# Patient Record
Sex: MALE | Race: BLACK OR AFRICAN AMERICAN | NOT HISPANIC OR LATINO | Employment: OTHER | URBAN - METROPOLITAN AREA
[De-identification: names, ages, dates, MRNs, and addresses within clinical notes are randomized per-mention and may not be internally consistent; named-entity substitution may affect disease eponyms.]

---

## 2020-05-15 ENCOUNTER — NURSE TRIAGE (OUTPATIENT)
Dept: ADMINISTRATIVE | Facility: CLINIC | Age: 75
End: 2020-05-15

## 2020-05-15 NOTE — TELEPHONE ENCOUNTER
Error encounter.   Reason for Disposition   Caller has cancelled the call before the first contact    Additional Information   Negative: Caller is angry or rude (e.g., hangs up, verbally abusive, yelling)   Negative: Caller hangs up    Protocols used: NO CONTACT OR DUPLICATE CONTACT CALL-A-AH

## 2022-09-22 ENCOUNTER — TELEPHONE (OUTPATIENT)
Dept: CARDIOTHORACIC SURGERY | Facility: CLINIC | Age: 77
End: 2022-09-22
Payer: MEDICARE

## 2022-09-22 NOTE — TELEPHONE ENCOUNTER
----- Message from Briseida Hooper sent at 9/22/2022  4:34 PM CDT -----  Contact: Dr Santana office    ----- Message -----  From: Miah Galvez  Sent: 9/22/2022   9:24 AM CDT  To: [facility] Pierce Staff    Please call Windy from Dr Santana office regarding a referral she is trying to send for the patient      CB#  [phone]

## 2022-09-29 ENCOUNTER — TELEPHONE (OUTPATIENT)
Dept: CARDIOTHORACIC SURGERY | Facility: CLINIC | Age: 77
End: 2022-09-29
Payer: MEDICARE

## 2022-10-07 DIAGNOSIS — Z99.2: Primary | ICD-10-CM

## 2022-10-11 ENCOUNTER — OFFICE VISIT (OUTPATIENT)
Dept: CARDIOTHORACIC SURGERY | Facility: CLINIC | Age: 77
End: 2022-10-11
Payer: MEDICARE

## 2022-10-11 VITALS
HEART RATE: 80 BPM | DIASTOLIC BLOOD PRESSURE: 72 MMHG | BODY MASS INDEX: 22.71 KG/M2 | SYSTOLIC BLOOD PRESSURE: 110 MMHG | HEIGHT: 68 IN | RESPIRATION RATE: 17 BRPM | WEIGHT: 149.88 LBS

## 2022-10-11 DIAGNOSIS — Z99.2: ICD-10-CM

## 2022-10-11 DIAGNOSIS — N18.6 ESRD (END STAGE RENAL DISEASE): ICD-10-CM

## 2022-10-11 PROCEDURE — 1159F MED LIST DOCD IN RCRD: CPT | Mod: CPTII,S$GLB,, | Performed by: SURGERY

## 2022-10-11 PROCEDURE — 99204 PR OFFICE/OUTPT VISIT, NEW, LEVL IV, 45-59 MIN: ICD-10-PCS | Mod: S$GLB,,, | Performed by: SURGERY

## 2022-10-11 PROCEDURE — 3074F PR MOST RECENT SYSTOLIC BLOOD PRESSURE < 130 MM HG: ICD-10-PCS | Mod: CPTII,S$GLB,, | Performed by: SURGERY

## 2022-10-11 PROCEDURE — 3074F SYST BP LT 130 MM HG: CPT | Mod: CPTII,S$GLB,, | Performed by: SURGERY

## 2022-10-11 PROCEDURE — 1159F PR MEDICATION LIST DOCUMENTED IN MEDICAL RECORD: ICD-10-PCS | Mod: CPTII,S$GLB,, | Performed by: SURGERY

## 2022-10-11 PROCEDURE — 3078F DIAST BP <80 MM HG: CPT | Mod: CPTII,S$GLB,, | Performed by: SURGERY

## 2022-10-11 PROCEDURE — 3078F PR MOST RECENT DIASTOLIC BLOOD PRESSURE < 80 MM HG: ICD-10-PCS | Mod: CPTII,S$GLB,, | Performed by: SURGERY

## 2022-10-11 PROCEDURE — 99204 OFFICE O/P NEW MOD 45 MIN: CPT | Mod: S$GLB,,, | Performed by: SURGERY

## 2022-10-11 RX ORDER — ROSUVASTATIN CALCIUM 10 MG/1
10 TABLET, COATED ORAL DAILY
COMMUNITY
Start: 2022-09-06

## 2022-10-11 RX ORDER — ZOLPIDEM TARTRATE 5 MG/1
5 TABLET ORAL NIGHTLY
COMMUNITY
Start: 2022-08-23

## 2022-10-11 RX ORDER — TOLTERODINE 4 MG/1
CAPSULE, EXTENDED RELEASE ORAL
COMMUNITY
Start: 2022-09-28

## 2022-10-11 RX ORDER — TAMSULOSIN HYDROCHLORIDE 0.4 MG/1
CAPSULE ORAL
COMMUNITY
Start: 2022-08-02

## 2022-10-11 NOTE — PROGRESS NOTES
"  Subjective:      Patient ID: Sukhwinder Larios is a 76 y.o. male who presents for evaluation of end stage renal disease     Chief Complaint: Dialysis access    HPI  Mr. Sukhwinder Larios is a 75 yo M with history of BPH and ESRD 2/2 to BPH on HD via RIJ TDC on M, W, and Fr. He presents to clinic for evaluation for dialysis access. He is right handed.       Review of Systems   Constitutional: Negative for malaise/fatigue and night sweats.   Cardiovascular:  Negative for claudication and cyanosis.   Skin:  Positive for poor wound healing.    Past Medical History:   Diagnosis Date    BPH with urinary obstruction     CKD (chronic kidney disease)     Elevated cholesterol     History of renal cell cancer 2014    Left    Insomnia       Past Surgical History:   Procedure Laterality Date    CERVICAL DISCECTOMY      PARTIAL NEPHRECTOMY Left 2014      Family History   Problem Relation Age of Onset    Heart disease Mother       Social History     Socioeconomic History    Marital status: Unknown   Tobacco Use    Smoking status: Never    Smokeless tobacco: Never   Substance and Sexual Activity    Alcohol use: Not Currently        Medication List with Changes/Refills   Current Medications    ROSUVASTATIN (CRESTOR) 10 MG TABLET    Take 10 mg by mouth once daily.    TAMSULOSIN (FLOMAX) 0.4 MG CAP        TOLTERODINE (DETROL LA) 4 MG 24 HR CAPSULE    TAKE 1 CAPSULE BY MOUTH EVERY DAY AS DIRECTED    ZOLPIDEM (AMBIEN) 5 MG TAB    Take 5 mg by mouth every evening.        Objective:     /72 (BP Location: Right arm, Patient Position: Sitting)   Pulse 80   Resp 17   Ht 5' 8" (1.727 m)   Wt 68 kg (149 lb 14.4 oz)   BMI 22.79 kg/m²   /83 (BP Location: Left arm, Patient Position: Sitting)  Physical Exam  Constitutional:       General: He is not in acute distress.     Appearance: He is not ill-appearing, toxic-appearing or diaphoretic.   HENT:      Head: Normocephalic.   Cardiovascular:      Rate and Rhythm: Normal rate and regular " rhythm.      Pulses: Normal pulses.   Pulmonary:      Effort: Pulmonary effort is normal. No respiratory distress.   Abdominal:      General: There is no distension.      Tenderness: There is no abdominal tenderness. There is no guarding.   Musculoskeletal:      Right upper arm: Normal.      Left upper arm: Normal.      Right elbow: Normal.      Left elbow: Normal.      Right forearm: Normal.      Left forearm: Normal.      Comments: No evidence of any significant venous collaterals.    Neurological:      General: No focal deficit present.                Assessment & Plan:     75 yo M with history of BPH and ESRD 2/2 to BPH on HD via RIJ TDC on M, W, and Fr. He presents to clinic for evaluation for dialysis access. He is right handed.     On exam, no evidence of any significant venous collaterals. No obvious cephalic vein bilaterally. Palpable radial pulses bilaterally.     I discussed with the patient and his wife the operation and associated risks. On review of his vein mapping, he has a good caliber basilic vein of the right upper extremity and the plan would be to perform a RUE brachiobasilic arteriovenous fistula. I discussed that unlike a radio- or brachio-cephalic, a brachiobasilic arteriovenous fistula could be done in one or two stages. He has a good caliber basilic vein so he would be a candidate for a single stage brachiobasilic arteriovenous fistula. I discussed risks including nerve injury, steal syndrome, and pain.     At this time, patient and his wife explained that they are hopeful that they may be a possibility that there may improvement in his kidney function after the prostate surgery that is scheduled for 10/31/2022 and that they would like to hold off on the arteriovenous fistula creation until they confirm the need for it.     PLAN:   - Return to clinic on 11/15/2022 after the prostate surgery to surgical options and re-evaluate kidney function     Librado Grajeda MD  Vascular Surgery Fellow

## 2022-11-23 ENCOUNTER — OFFICE VISIT (OUTPATIENT)
Dept: CARDIOTHORACIC SURGERY | Facility: CLINIC | Age: 77
End: 2022-11-23
Payer: MEDICARE

## 2022-11-23 VITALS
HEART RATE: 83 BPM | SYSTOLIC BLOOD PRESSURE: 122 MMHG | DIASTOLIC BLOOD PRESSURE: 76 MMHG | OXYGEN SATURATION: 98 % | TEMPERATURE: 99 F

## 2022-11-23 DIAGNOSIS — N18.6 END STAGE RENAL DISEASE: Primary | ICD-10-CM

## 2022-11-23 PROCEDURE — 3074F PR MOST RECENT SYSTOLIC BLOOD PRESSURE < 130 MM HG: ICD-10-PCS | Mod: CPTII,S$GLB,, | Performed by: SURGERY

## 2022-11-23 PROCEDURE — 99212 OFFICE O/P EST SF 10 MIN: CPT | Mod: S$GLB,,, | Performed by: SURGERY

## 2022-11-23 PROCEDURE — 3074F SYST BP LT 130 MM HG: CPT | Mod: CPTII,S$GLB,, | Performed by: SURGERY

## 2022-11-23 PROCEDURE — 99212 PR OFFICE/OUTPT VISIT, EST, LEVL II, 10-19 MIN: ICD-10-PCS | Mod: S$GLB,,, | Performed by: SURGERY

## 2022-11-23 PROCEDURE — 3078F PR MOST RECENT DIASTOLIC BLOOD PRESSURE < 80 MM HG: ICD-10-PCS | Mod: CPTII,S$GLB,, | Performed by: SURGERY

## 2022-11-23 PROCEDURE — 1126F PR PAIN SEVERITY QUANTIFIED, NO PAIN PRESENT: ICD-10-PCS | Mod: CPTII,S$GLB,, | Performed by: SURGERY

## 2022-11-23 PROCEDURE — 1159F PR MEDICATION LIST DOCUMENTED IN MEDICAL RECORD: ICD-10-PCS | Mod: CPTII,S$GLB,, | Performed by: SURGERY

## 2022-11-23 PROCEDURE — 1159F MED LIST DOCD IN RCRD: CPT | Mod: CPTII,S$GLB,, | Performed by: SURGERY

## 2022-11-23 PROCEDURE — 3078F DIAST BP <80 MM HG: CPT | Mod: CPTII,S$GLB,, | Performed by: SURGERY

## 2022-11-23 PROCEDURE — 1126F AMNT PAIN NOTED NONE PRSNT: CPT | Mod: CPTII,S$GLB,, | Performed by: SURGERY

## 2022-12-02 NOTE — PROGRESS NOTES
FOLLOW-UP    75 yo M presents to clinic for evaluation for dialysis access creation. Currently on dialysis through tunneled dialysis catheter. Patient was last seen for dialysis access creation in October. At that time, patient had requested to hold off on dialysis access creation until he had undergone his prostate surgery. He has now recovered from his prostate surgery.     Spoke to the patient about dialysis access creation. He would like to give some time to see if his kidneys recover. Will plan to have the patient return to clinic in January to see if there is any improvement in kidney function. If there is no improvement, we will plan at that time to schedule patient for dialysis access creation.

## 2023-01-03 ENCOUNTER — TELEPHONE (OUTPATIENT)
Dept: CARDIOTHORACIC SURGERY | Facility: CLINIC | Age: 78
End: 2023-01-03
Payer: MEDICARE

## 2023-01-03 NOTE — TELEPHONE ENCOUNTER
Spoke with pt and R/s'd to Thursday       ----- Message from Dariana Marroquin sent at 1/3/2023 11:28 AM CST -----  Type:  Needs Medical Advice    Who Called: Sukhwinder Larios    Symptoms (please be specific): -   How long has patient had these symptoms:  -  Pharmacy name and phone #:  -  Would the patient rather a call back or a response via [facility]?    Best Call Back Number: [phone]    Additional Information:  pt is unable to make appt tomorrow ( he has dialysis on Wed) would like to schedule for next week if possible please call

## 2023-01-10 ENCOUNTER — OFFICE VISIT (OUTPATIENT)
Dept: CARDIOTHORACIC SURGERY | Facility: CLINIC | Age: 78
End: 2023-01-10
Payer: MEDICARE

## 2023-01-10 VITALS
HEART RATE: 80 BPM | RESPIRATION RATE: 16 BRPM | DIASTOLIC BLOOD PRESSURE: 73 MMHG | BODY MASS INDEX: 22.58 KG/M2 | OXYGEN SATURATION: 90 % | HEIGHT: 68 IN | WEIGHT: 149 LBS | SYSTOLIC BLOOD PRESSURE: 111 MMHG

## 2023-01-10 DIAGNOSIS — N40.0 BENIGN PROSTATIC HYPERPLASIA, UNSPECIFIED WHETHER LOWER URINARY TRACT SYMPTOMS PRESENT: ICD-10-CM

## 2023-01-10 DIAGNOSIS — Z99.2 END STAGE RENAL DISEASE ON DIALYSIS: Primary | ICD-10-CM

## 2023-01-10 DIAGNOSIS — G47.00 INSOMNIA, UNSPECIFIED TYPE: ICD-10-CM

## 2023-01-10 DIAGNOSIS — N18.6 END STAGE RENAL DISEASE ON DIALYSIS: Primary | ICD-10-CM

## 2023-01-10 DIAGNOSIS — E78.5 HYPERLIPIDEMIA, UNSPECIFIED HYPERLIPIDEMIA TYPE: ICD-10-CM

## 2023-01-10 PROCEDURE — 3078F PR MOST RECENT DIASTOLIC BLOOD PRESSURE < 80 MM HG: ICD-10-PCS | Mod: CPTII,S$GLB,, | Performed by: SURGERY

## 2023-01-10 PROCEDURE — 99214 OFFICE O/P EST MOD 30 MIN: CPT | Mod: S$GLB,,, | Performed by: SURGERY

## 2023-01-10 PROCEDURE — 3074F SYST BP LT 130 MM HG: CPT | Mod: CPTII,S$GLB,, | Performed by: SURGERY

## 2023-01-10 PROCEDURE — 3078F DIAST BP <80 MM HG: CPT | Mod: CPTII,S$GLB,, | Performed by: SURGERY

## 2023-01-10 PROCEDURE — 99214 PR OFFICE/OUTPT VISIT, EST, LEVL IV, 30-39 MIN: ICD-10-PCS | Mod: S$GLB,,, | Performed by: SURGERY

## 2023-01-10 PROCEDURE — 3074F PR MOST RECENT SYSTOLIC BLOOD PRESSURE < 130 MM HG: ICD-10-PCS | Mod: CPTII,S$GLB,, | Performed by: SURGERY

## 2023-01-10 NOTE — PROGRESS NOTES
[facility] - Vascular Surgery  History & Physical      Subjective:     Reason for Clinic Visit: Creation of permanent dialysis access     History of Present Illness:  78 yo gentleman with history of ESRD on dialysis MWF via R IJ TDC, BPH, HLD, and insomnia presents to clinic for evaluation of permanent dialysis access. He had been previously evaluated in October at which time he decided that he wanted to wait after his prostate surgery to see if he had any improvement in his kidney function. He has not improved since then. He is right handed.       Current Outpatient Medications on File Prior to Visit   Medication Sig    rosuvastatin (CRESTOR) 10 MG tablet Take 10 mg by mouth once daily.    tamsulosin (FLOMAX) 0.4 mg Cap     tolterodine (DETROL LA) 4 MG 24 hr capsule TAKE 1 CAPSULE BY MOUTH EVERY DAY AS DIRECTED    zolpidem (AMBIEN) 5 MG Tab Take 5 mg by mouth every evening.     No current facility-administered medications on file prior to visit.       Review of patient's allergies indicates:  No Known Allergies    Past Medical History:   Diagnosis Date    BPH with urinary obstruction     CKD (chronic kidney disease)     Elevated cholesterol     History of renal cell cancer 2014    Left    Insomnia      Past Surgical History:   Procedure Laterality Date    CERVICAL DISCECTOMY      PARTIAL NEPHRECTOMY Left 2014    TRANSURETHRAL RESECTION OF PROSTATE       Family History       Problem Relation (Age of Onset)    Heart disease Mother          Tobacco Use    Smoking status: Never    Smokeless tobacco: Never   Substance and Sexual Activity    Alcohol use: Not Currently    Drug use: Not on file    Sexual activity: Not on file     Review of Systems   Constitutional:  Negative for chills and fever.   Respiratory:  Negative for shortness of breath.    Cardiovascular:  Negative for chest pain.   Gastrointestinal:  Negative for abdominal distention, abdominal pain, nausea and vomiting.   Objective:     Vital Signs (Most  Recent):  Pulse: 80 (01/10/23 1432)  Resp: 16 (01/10/23 1432)  BP: 111/73 (01/10/23 1432)  SpO2: (!) 90 % (01/10/23 1432)   Vital Signs (24h Range):  [unfilled]     Weight: 67.6 kg (149 lb)  Body mass index is 22.66 kg/m².    Physical Exam  Constitutional:       General: He is not in acute distress.     Appearance: Normal appearance.   HENT:      Head: Normocephalic and atraumatic.   Cardiovascular:      Pulses:           Radial pulses are 2+ on the right side and 2+ on the left side.   Pulmonary:      Effort: No respiratory distress.      Breath sounds: No wheezing.   Abdominal:      General: There is no distension.      Palpations: Abdomen is soft.      Tenderness: There is no abdominal tenderness.   Neurological:      General: No focal deficit present.      Mental Status: He is alert and oriented to person, place, and time.       Reviewed outpatient vein mapping - good caliber R basilic vein; marginal veins in the left upper extremity     Assessment/Plan:   76 yo gentleman with history of ESRD on dialysis MWF via R IJ TDC, BPH, HLD, and insomnia presents to clinic for evaluation of permanent dialysis access. He had been previously evaluated in October at which time he decided that he wanted to wait after his prostate surgery to see if he had any improvement in his kidney function. He has not improved since then. He is right handed.     Discussed with the patient that we will attempt placement of left upper extremity arteriovenous fistula after regional block. If unable to find a good caliber vein, then we will abort placement of left upper extremity arteriovenous fistula and attempt placement of right upper extremity arteriovenous fistula at a later date.     Discussed that patient not get any labs drawn from the left upper extremity.     Discussed that patient perform left hand exercises to help with increasing vein diameter prior to surgery    Will proceed with LUE AVF creation on 01/26/2022      Libraod ST  MD Nicci  General Surgery  [place] - Cardiothoracic Surgery

## 2023-01-26 ENCOUNTER — TELEPHONE (OUTPATIENT)
Dept: CARDIOTHORACIC SURGERY | Facility: CLINIC | Age: 78
End: 2023-01-26

## 2023-01-26 ENCOUNTER — OUTSIDE PLACE OF SERVICE (OUTPATIENT)
Dept: CARDIOTHORACIC SURGERY | Facility: CLINIC | Age: 78
End: 2023-01-26

## 2023-01-26 PROCEDURE — 36821 PR ANASTOMOSIS,AV,ANY SITE: ICD-10-PCS | Mod: ,,, | Performed by: SURGERY

## 2023-01-26 PROCEDURE — 36821 AV FUSION DIRECT ANY SITE: CPT | Mod: ,,, | Performed by: SURGERY

## 2023-01-26 NOTE — TELEPHONE ENCOUNTER
Spoke with spouse and Dr. Grajeda  ----- Message from Claudia Soto sent at 1/26/2023  2:54 PM CST -----  Regarding: pt advice  Contact: Ely/wife  Ely/wife is calling with a couple of questions that they did not think to ask this morning. Please call back at [phone]//thank you acc

## 2023-02-21 ENCOUNTER — OFFICE VISIT (OUTPATIENT)
Dept: CARDIOTHORACIC SURGERY | Facility: CLINIC | Age: 78
End: 2023-02-21
Payer: MEDICARE

## 2023-02-21 VITALS
SYSTOLIC BLOOD PRESSURE: 113 MMHG | HEART RATE: 84 BPM | BODY MASS INDEX: 23.19 KG/M2 | HEIGHT: 68 IN | DIASTOLIC BLOOD PRESSURE: 67 MMHG | WEIGHT: 153 LBS | OXYGEN SATURATION: 98 %

## 2023-02-21 DIAGNOSIS — Z99.2 END STAGE RENAL DISEASE ON DIALYSIS: Primary | ICD-10-CM

## 2023-02-21 DIAGNOSIS — N18.6 END STAGE RENAL DISEASE ON DIALYSIS: Primary | ICD-10-CM

## 2023-02-21 PROCEDURE — 99024 PR POST-OP FOLLOW-UP VISIT: ICD-10-PCS | Mod: S$GLB,,, | Performed by: SURGERY

## 2023-02-21 PROCEDURE — 3078F PR MOST RECENT DIASTOLIC BLOOD PRESSURE < 80 MM HG: ICD-10-PCS | Mod: CPTII,S$GLB,, | Performed by: SURGERY

## 2023-02-21 PROCEDURE — 1159F MED LIST DOCD IN RCRD: CPT | Mod: CPTII,S$GLB,, | Performed by: SURGERY

## 2023-02-21 PROCEDURE — 1159F PR MEDICATION LIST DOCUMENTED IN MEDICAL RECORD: ICD-10-PCS | Mod: CPTII,S$GLB,, | Performed by: SURGERY

## 2023-02-21 PROCEDURE — 99024 POSTOP FOLLOW-UP VISIT: CPT | Mod: S$GLB,,, | Performed by: SURGERY

## 2023-02-21 PROCEDURE — 1126F AMNT PAIN NOTED NONE PRSNT: CPT | Mod: CPTII,S$GLB,, | Performed by: SURGERY

## 2023-02-21 PROCEDURE — 3078F DIAST BP <80 MM HG: CPT | Mod: CPTII,S$GLB,, | Performed by: SURGERY

## 2023-02-21 PROCEDURE — 1126F PR PAIN SEVERITY QUANTIFIED, NO PAIN PRESENT: ICD-10-PCS | Mod: CPTII,S$GLB,, | Performed by: SURGERY

## 2023-02-21 PROCEDURE — 3074F SYST BP LT 130 MM HG: CPT | Mod: CPTII,S$GLB,, | Performed by: SURGERY

## 2023-02-21 PROCEDURE — 3074F PR MOST RECENT SYSTOLIC BLOOD PRESSURE < 130 MM HG: ICD-10-PCS | Mod: CPTII,S$GLB,, | Performed by: SURGERY

## 2023-02-21 NOTE — PROGRESS NOTES
[facility] - Vascular Surgery  Follow-up Visit       Subjective:     Chief Complaint/Reason for Clinic Visit: Follow-up after left upper extremity first stage brachiobasilic AVF creation (1/26/2023)    History of Present Illness:  78 yo gentleman with hx of ESRD on dialysis MWF via R IJ TDC, BPH, HLD, and insomnia underwent LUE first stage brachiobasilic AVF creation on 1/26/2023. He presents today for follow-up. He is doing well. He does report some numbness in the upper aspect of his left forearm. He also notes intermittent numbness and tingling in his left fingertips. He reports that his left hand feels cooler than it usually does.     Current Outpatient Medications on File Prior to Visit   Medication Sig Dispense Refill    rosuvastatin (CRESTOR) 10 MG tablet Take 10 mg by mouth once daily.      tamsulosin (FLOMAX) 0.4 mg Cap       tolterodine (DETROL LA) 4 MG 24 hr capsule TAKE 1 CAPSULE BY MOUTH EVERY DAY AS DIRECTED      zolpidem (AMBIEN) 5 MG Tab Take 5 mg by mouth every evening.       No current facility-administered medications on file prior to visit.       Review of patient's allergies indicates:  No Known Allergies  Past Medical History:   Diagnosis Date    BPH with urinary obstruction     CKD (chronic kidney disease)     Elevated cholesterol     History of renal cell cancer 2014    Left    Insomnia        Past Surgical History:   Procedure Laterality Date    CERVICAL DISCECTOMY      Left Arteriovenous Fistula  Creation Left 01/26/2023    PARTIAL NEPHRECTOMY Left 2014    TRANSURETHRAL RESECTION OF PROSTATE         Family History   Problem Relation Age of Onset    Heart disease Mother        Social History     Socioeconomic History    Marital status:    Tobacco Use    Smoking status: Never    Smokeless tobacco: Never   Substance and Sexual Activity    Alcohol use: Not Currently       Review of Systems   Constitutional:  Negative for chills and fever.   Respiratory:  Negative for cough and  "shortness of breath.    Cardiovascular:  Negative for palpitations and claudication.   Gastrointestinal:  Negative for abdominal pain, diarrhea, nausea and vomiting.   Neurological:  Positive for tingling and sensory change.        Tingling of left finger tips   Occasional numbness in the left forearm    Psychiatric/Behavioral:  Negative for depression and hallucinations. The patient is not nervous/anxious.        Objective:   /67 (BP Location: Left arm, Patient Position: Sitting, BP Method: Medium (Manual))   Pulse 84   Ht 5' 8" (1.727 m)   Wt 69.4 kg (153 lb)   SpO2 98%   BMI 23.26 kg/m²     Physical Exam  Constitutional:       General: He is not in acute distress.     Appearance: Normal appearance. He is normal weight. He is not ill-appearing.   HENT:      Head: Normocephalic and atraumatic.   Cardiovascular:      Rate and Rhythm: Normal rate and regular rhythm.      Pulses: Normal pulses.           Radial pulses are 2+ on the right side and 2+ on the left side.      Comments: Palpable thrill over the medial aspect of the left upper arm   Pulmonary:      Effort: Pulmonary effort is normal. No respiratory distress.      Breath sounds: Normal breath sounds.   Abdominal:      General: Abdomen is flat. There is no distension.      Palpations: Abdomen is soft.   Skin:            Comments: Incisions of the left forearm healing well; no signs of infection    Neurological:      General: No focal deficit present.      Mental Status: He is alert and oriented to person, place, and time. Mental status is at baseline.   Psychiatric:         Mood and Affect: Mood normal.         Behavior: Behavior normal.         Thought Content: Thought content normal.       Assessment/Plan:   78 yo gentleman with hx of ESRD on dialysis MWF via R IJ TDC, BPH, HLD, and insomnia underwent LUE first stage brachiobasilic AVF creation on 1/26/2023. He presents today for follow-up. He is doing well. He does report some numbness in the " upper aspect of his left forearm. He also notes intermittent numbness and tingling in his left fingertips. He reports that his left hand feels cooler than it usually does.     He has 2+ palpable radial pulses bilaterally. There is concern for possible Grade 1 steal syndrome. Explained the pathophysiology of steal syndrome to the patient and his wife.     - Follow-up in 1 month with US of the LUE AVF to evaluate for maturity; will need to schedule patient for second stage LUE brachiobasilic AVF creation (superficialization vs transposition)   - Educated patient on LUE exercises to improve circulation to the L hand given Grade 1 steal syndrome; explained that if patient develops pain or tissue loss, may need adjunct operation to improve the circulation to the left hand (e.g. KAUSHIK ODEN, ELISEO)  - Regarding the numbness in the L forearm, patient may have damage to superficial nerve of the L forearm that may take time to improve  - Incisions otherwise healing well; no issue or concern at this time      Librado Grajeda MD  Vascular Surgery

## 2023-04-10 ENCOUNTER — OFFICE VISIT (OUTPATIENT)
Dept: CARDIOTHORACIC SURGERY | Facility: CLINIC | Age: 78
End: 2023-04-10
Payer: MEDICARE

## 2023-04-10 VITALS
HEIGHT: 68 IN | BODY MASS INDEX: 23.04 KG/M2 | DIASTOLIC BLOOD PRESSURE: 71 MMHG | WEIGHT: 152 LBS | TEMPERATURE: 99 F | HEART RATE: 90 BPM | OXYGEN SATURATION: 98 % | SYSTOLIC BLOOD PRESSURE: 123 MMHG

## 2023-04-10 DIAGNOSIS — Z99.2 END STAGE RENAL DISEASE ON DIALYSIS: ICD-10-CM

## 2023-04-10 DIAGNOSIS — Z98.890 S/P ARTERIOVENOUS (AV) FISTULA CREATION: Primary | ICD-10-CM

## 2023-04-10 DIAGNOSIS — N18.6 END STAGE RENAL DISEASE ON DIALYSIS: ICD-10-CM

## 2023-04-10 PROCEDURE — 99214 PR OFFICE/OUTPT VISIT, EST, LEVL IV, 30-39 MIN: ICD-10-PCS | Mod: 24,S$GLB,, | Performed by: SURGERY

## 2023-04-10 PROCEDURE — 3078F DIAST BP <80 MM HG: CPT | Mod: CPTII,S$GLB,, | Performed by: SURGERY

## 2023-04-10 PROCEDURE — 99214 OFFICE O/P EST MOD 30 MIN: CPT | Mod: 24,S$GLB,, | Performed by: SURGERY

## 2023-04-10 PROCEDURE — 3078F PR MOST RECENT DIASTOLIC BLOOD PRESSURE < 80 MM HG: ICD-10-PCS | Mod: CPTII,S$GLB,, | Performed by: SURGERY

## 2023-04-10 PROCEDURE — 1126F PR PAIN SEVERITY QUANTIFIED, NO PAIN PRESENT: ICD-10-PCS | Mod: CPTII,S$GLB,, | Performed by: SURGERY

## 2023-04-10 PROCEDURE — 1159F MED LIST DOCD IN RCRD: CPT | Mod: CPTII,S$GLB,, | Performed by: SURGERY

## 2023-04-10 PROCEDURE — 3074F SYST BP LT 130 MM HG: CPT | Mod: CPTII,S$GLB,, | Performed by: SURGERY

## 2023-04-10 PROCEDURE — 1126F AMNT PAIN NOTED NONE PRSNT: CPT | Mod: CPTII,S$GLB,, | Performed by: SURGERY

## 2023-04-10 PROCEDURE — 3074F PR MOST RECENT SYSTOLIC BLOOD PRESSURE < 130 MM HG: ICD-10-PCS | Mod: CPTII,S$GLB,, | Performed by: SURGERY

## 2023-04-10 PROCEDURE — 1159F PR MEDICATION LIST DOCUMENTED IN MEDICAL RECORD: ICD-10-PCS | Mod: CPTII,S$GLB,, | Performed by: SURGERY

## 2023-04-10 NOTE — PROGRESS NOTES
[facility] - Vascular Surgery  Follow-up Visit       Subjective:     Chief Complaint/Reason for Clinic Visit:  Follow-up after left upper extremity brachiobasilic arteriovenous fistula creation    History of Present Illness:  77-year-old gentleman with history of hyperlipidemia, and end-stage renal disease on hemodialysis via right IJ tunneled dialysis catheter Monday Wednesday Friday presents to clinic for follow-up after 1st stage left brachiobasilic arteriovenous fistula creation on January 26th 2023.  Patient presents now for follow-up.  Patient does report intermittent coldness and tingling of the left hand and fingertips.  Patient also notes pain along the medial aspect of his left upper forearm.      Current Outpatient Medications on File Prior to Visit   Medication Sig Dispense Refill    rosuvastatin (CRESTOR) 10 MG tablet Take 10 mg by mouth once daily.      tamsulosin (FLOMAX) 0.4 mg Cap       tolterodine (DETROL LA) 4 MG 24 hr capsule TAKE 1 CAPSULE BY MOUTH EVERY DAY AS DIRECTED      zolpidem (AMBIEN) 5 MG Tab Take 5 mg by mouth every evening.       No current facility-administered medications on file prior to visit.       Review of patient's allergies indicates:  No Known Allergies  Past Medical History:   Diagnosis Date    BPH with urinary obstruction     CKD (chronic kidney disease)     Elevated cholesterol     History of renal cell cancer 2014    Left    Insomnia        Past Surgical History:   Procedure Laterality Date    CERVICAL DISCECTOMY      Left Arteriovenous Fistula  Creation Left 01/26/2023    PARTIAL NEPHRECTOMY Left 2014    TRANSURETHRAL RESECTION OF PROSTATE         Family History   Problem Relation Age of Onset    Heart disease Mother        Social History     Socioeconomic History    Marital status:    Tobacco Use    Smoking status: Never    Smokeless tobacco: Never   Substance and Sexual Activity    Alcohol use: Not Currently       Review of Systems   Constitutional:  Negative  "for chills, fever and malaise/fatigue.   Respiratory:  Negative for cough and shortness of breath.    Cardiovascular:  Negative for chest pain, palpitations, claudication and leg swelling.   Gastrointestinal:  Negative for abdominal pain, constipation, diarrhea, nausea and vomiting.   Genitourinary:  Negative for dysuria, frequency and urgency.   Neurological:  Positive for tingling and sensory change.        Intermittent numbness and tingling of the left hand and fingertips.     Objective:   /71 (BP Location: Left arm, Patient Position: Sitting, BP Method: Medium (Automatic))   Pulse 90   Temp 99.2 °F (37.3 °C) (Temporal)   Ht 5' 8" (1.727 m)   Wt 68.9 kg (152 lb)   SpO2 98%   BMI 23.11 kg/m²     Physical Exam  Constitutional:       General: He is not in acute distress.     Appearance: Normal appearance. He is normal weight.   HENT:      Head: Normocephalic and atraumatic.   Cardiovascular:      Rate and Rhythm: Normal rate and regular rhythm.      Comments: Palpable thrill over left brachiobasilic arteriovenous fistula  Pulmonary:      Effort: Pulmonary effort is normal. No respiratory distress.      Breath sounds: Normal breath sounds.   Abdominal:      General: Abdomen is flat. Bowel sounds are normal. There is no distension.      Palpations: Abdomen is soft.   Neurological:      General: No focal deficit present.      Mental Status: He is alert and oriented to person, place, and time. Mental status is at baseline.   Psychiatric:         Mood and Affect: Mood normal.         Behavior: Behavior normal.         Thought Content: Thought content normal.     Clinic ultrasound showed that fistula is ranging between 4-7 mm in size throughout the course of the fistula.  There are several large branches coming off the fistula.    Assessment/Plan:   77-year-old gentleman with history of hyperlipidemia, and end-stage renal disease on hemodialysis via right IJ tunneled dialysis catheter Monday Wednesday Friday " presents to clinic for follow-up after 1st stage left brachiobasilic arteriovenous fistula creation on January 26th 2023.  Patient presents now for follow-up.     Fistula is continuing to mature appropriately.    - Will perform second-stage left brachial basilic arteriovenous fistula creation (left brachiobasilic arteriovenous fistula superficialization) on Thursday April 27, 2023  - Patient will return to clinic for preoperative workup and lab work   - Answered patient's and his wife's questions to their satisfaction.  Patient has wife expressed understanding of the plan.    Librado Grajeda MD  Vascular Surgery

## 2023-04-25 ENCOUNTER — CLINICAL SUPPORT (OUTPATIENT)
Dept: CARDIOTHORACIC SURGERY | Facility: CLINIC | Age: 78
End: 2023-04-25
Payer: MEDICARE

## 2023-04-25 DIAGNOSIS — N18.6 ESRD (END STAGE RENAL DISEASE): Primary | ICD-10-CM

## 2023-04-25 LAB
ABS NRBC COUNT: 0 THOU/UL (ref 0–0.01)
ABSOLUTE BASOPHIL: 0 10*3/UL (ref 0–0.3)
ABSOLUTE EOSINOPHIL: 0.1 10*3/UL (ref 0–0.6)
ABSOLUTE IMMATURE GRAN: 0.02 THOU/UL (ref 0–0.03)
ABSOLUTE LYMPHOCYTE: 1.2 10*3/UL (ref 1.2–4)
ABSOLUTE MONOCYTE: 0.7 10*3/UL (ref 0.1–0.8)
ALBUMIN SERPL BCP-MCNC: 3.6 G/DL (ref 3.4–5)
ALP SERPL-CCNC: 91 U/L (ref 45–117)
ALT SERPL W P-5'-P-CCNC: 20 U/L (ref 16–61)
ANION GAP SERPL CALC-SCNC: 4 MMOL/L (ref 3–11)
APPEARANCE, UA: CLEAR
APTT PPP: 29.9 SEC (ref 25.7–36.7)
AST SERPL-CCNC: 22 U/L (ref 15–37)
BACTERIA SPEC CULT: ABNORMAL /HPF
BASOPHILS NFR BLD: 0.6 % (ref 0–3)
BILIRUB SERPL-MCNC: 0.5 MG/DL (ref 0.2–1)
BILIRUB UR QL STRIP: NEGATIVE
BUN SERPL-MCNC: 36 MG/DL (ref 7–18)
BUN/CREAT SERPL: 7.3 RATIO
CALCIUM SERPL-MCNC: 9.9 MG/DL (ref 8.5–10.1)
CHLORIDE SERPL-SCNC: 98 MMOL/L (ref 98–107)
CO2 SERPL-SCNC: 31 MMOL/L (ref 21–32)
COLOR UR: ABNORMAL
CREAT SERPL-MCNC: 4.93 MG/DL (ref 0.7–1.3)
EOSINOPHIL NFR BLD: 0.9 % (ref 0–6)
ERYTHROCYTE [DISTWIDTH] IN BLOOD BY AUTOMATED COUNT: 13.8 % (ref 0–15.5)
ESTIMATED AVG GLUCOSE: 122 MG/DL
GFR ESTIMATION: 14
GLUCOSE (UA): NEGATIVE MG/DL
GLUCOSE SERPL-MCNC: 95 MG/DL (ref 74–106)
HBA1C MFR BLD: 5.6 % (ref 4.2–6.3)
HCT VFR BLD AUTO: 32.5 % (ref 42–52)
HGB BLD-MCNC: 10.7 G/DL (ref 14–18)
HGB UR QL STRIP: ABNORMAL
IMMATURE GRANULOCYTES: 0.3 % (ref 0–0.43)
INR PPP: 1.1 INR (ref 0.9–1.1)
KETONES UR QL STRIP: NEGATIVE MG/DL
LEUKOCYTE ESTERASE UR QL STRIP: NEGATIVE LEU/UL
LYMPHOCYTES NFR BLD: 18.5 % (ref 20–45)
MCH RBC QN AUTO: 32.4 PG (ref 27–32)
MCHC RBC AUTO-ENTMCNC: 32.9 % (ref 32–36)
MCV RBC AUTO: 98.5 FL (ref 80–97)
MONOCYTES NFR BLD: 11.1 % (ref 2–10)
NEUTROPHILS # BLD AUTO: 4.5 10*3/UL (ref 1.4–7)
NEUTROPHILS NFR BLD: 68.6 % (ref 50–80)
NITRITE UR QL STRIP: NEGATIVE
NUCLEATED RED BLOOD CELLS: 0 % (ref 0–0.2)
PH UR STRIP: 8.5 PH (ref 5–8)
PLATELETS: 225 10*3/UL (ref 130–400)
PMV BLD AUTO: 11 FL (ref 9.2–12.2)
POTASSIUM SERPL-SCNC: 3.7 MMOL/L (ref 3.5–5.1)
PROT SERPL-MCNC: 8 G/DL (ref 6.4–8.2)
PROT UR QL STRIP: 100 MG/DL
PROTHROMBIN TIME: 12.2 SEC (ref 10.2–12.9)
RBC # BLD AUTO: 3.3 10*6/UL (ref 4.7–6.1)
RBC #/AREA URNS HPF: ABNORMAL /HPF (ref 0–2)
SERVICE COMMENT 03: ABNORMAL
SODIUM BLD-SCNC: 133 MMOL/L (ref 131–143)
SP GR UR STRIP: 1.01 (ref 1–1.03)
SQUAMOUS EPITHELIAL, UA: ABNORMAL /LPF
TSH SERPL DL<=0.005 MIU/L-ACNC: 0.52 UIU/ML (ref 0.36–3.74)
UROBILINOGEN UR STRIP-ACNC: NORMAL MG/DL
WBC # BLD: 6.5 10*3/UL (ref 4.5–10)
WBC #/AREA URNS HPF: ABNORMAL /HPF (ref 0–2)

## 2023-04-27 ENCOUNTER — OUTSIDE PLACE OF SERVICE (OUTPATIENT)
Dept: CARDIOTHORACIC SURGERY | Facility: CLINIC | Age: 78
End: 2023-04-27
Payer: MEDICARE

## 2023-04-27 LAB — POTASSIUM SERPL-SCNC: 3.6 MMOL/L (ref 3.5–5.1)

## 2023-04-27 PROCEDURE — 36832 AV FISTULA REVISION OPEN: CPT | Mod: ,,, | Performed by: SURGERY

## 2023-04-27 PROCEDURE — 36832 PR AV FISTULA REVISION, OPEN, W/O THROMBECTOMY: ICD-10-PCS | Mod: ,,, | Performed by: SURGERY

## 2023-04-28 ENCOUNTER — OUTSIDE PLACE OF SERVICE (OUTPATIENT)
Dept: CARDIOTHORACIC SURGERY | Facility: CLINIC | Age: 78
End: 2023-04-28
Payer: MEDICARE

## 2023-04-28 PROCEDURE — 99024 PR POST-OP FOLLOW-UP VISIT: ICD-10-PCS | Mod: ,,, | Performed by: NURSE PRACTITIONER

## 2023-04-28 PROCEDURE — 99024 POSTOP FOLLOW-UP VISIT: CPT | Mod: ,,, | Performed by: NURSE PRACTITIONER

## 2023-05-01 ENCOUNTER — OFFICE VISIT (OUTPATIENT)
Dept: CARDIOTHORACIC SURGERY | Facility: CLINIC | Age: 78
End: 2023-05-01
Payer: MEDICARE

## 2023-05-01 VITALS
HEART RATE: 95 BPM | DIASTOLIC BLOOD PRESSURE: 73 MMHG | OXYGEN SATURATION: 96 % | SYSTOLIC BLOOD PRESSURE: 123 MMHG | RESPIRATION RATE: 18 BRPM | WEIGHT: 154.63 LBS | BODY MASS INDEX: 23.51 KG/M2

## 2023-05-01 DIAGNOSIS — N18.6 ESRD ON DIALYSIS: Primary | ICD-10-CM

## 2023-05-01 DIAGNOSIS — Z99.2 ESRD ON DIALYSIS: Primary | ICD-10-CM

## 2023-05-01 DIAGNOSIS — E78.5 HYPERLIPIDEMIA, UNSPECIFIED HYPERLIPIDEMIA TYPE: ICD-10-CM

## 2023-05-01 DIAGNOSIS — Z98.890 S/P ARTERIOVENOUS (AV) FISTULA CREATION: ICD-10-CM

## 2023-05-01 PROCEDURE — 3074F SYST BP LT 130 MM HG: CPT | Mod: CPTII,S$GLB,, | Performed by: SURGERY

## 2023-05-01 PROCEDURE — 1126F PR PAIN SEVERITY QUANTIFIED, NO PAIN PRESENT: ICD-10-PCS | Mod: CPTII,S$GLB,, | Performed by: SURGERY

## 2023-05-01 PROCEDURE — 3078F DIAST BP <80 MM HG: CPT | Mod: CPTII,S$GLB,, | Performed by: SURGERY

## 2023-05-01 PROCEDURE — 3078F PR MOST RECENT DIASTOLIC BLOOD PRESSURE < 80 MM HG: ICD-10-PCS | Mod: CPTII,S$GLB,, | Performed by: SURGERY

## 2023-05-01 PROCEDURE — 99024 POSTOP FOLLOW-UP VISIT: CPT | Mod: S$GLB,,, | Performed by: SURGERY

## 2023-05-01 PROCEDURE — 1126F AMNT PAIN NOTED NONE PRSNT: CPT | Mod: CPTII,S$GLB,, | Performed by: SURGERY

## 2023-05-01 PROCEDURE — 99024 PR POST-OP FOLLOW-UP VISIT: ICD-10-PCS | Mod: S$GLB,,, | Performed by: SURGERY

## 2023-05-01 PROCEDURE — 3074F PR MOST RECENT SYSTOLIC BLOOD PRESSURE < 130 MM HG: ICD-10-PCS | Mod: CPTII,S$GLB,, | Performed by: SURGERY

## 2023-05-01 NOTE — PROGRESS NOTES
[facility] - Vascular Surgery  Follow-up Visit       Subjective:     Chief Complaint/Reason for Clinic Visit:  Follow-up for wound check and drain    History of Present Illness:  Sukhwinder Larios is a 77 y.o. male with history of end-stage renal disease on hemodialysis via right IJ tunneled dialysis catheter Monday, Wednesday who presents for P.O. f/u of Left upper extremity brachiobasilic arteriovenous fistula elevation and transposition on 4/27/23. He has a heidi drain in place that has had mimimal bloody drainage over the weekend. He denies any s/s of infection at the surgical site. He denies pain in the left hand or problems with circulation. He denies fever or chills    Current Outpatient Medications on File Prior to Visit   Medication Sig Dispense Refill    rosuvastatin (CRESTOR) 10 MG tablet Take 10 mg by mouth once daily.      tamsulosin (FLOMAX) 0.4 mg Cap       tolterodine (DETROL LA) 4 MG 24 hr capsule TAKE 1 CAPSULE BY MOUTH EVERY DAY AS DIRECTED      zolpidem (AMBIEN) 5 MG Tab Take 5 mg by mouth every evening.       No current facility-administered medications on file prior to visit.       Review of patient's allergies indicates:  No Known Allergies  Past Medical History:   Diagnosis Date    BPH with urinary obstruction     CKD (chronic kidney disease)     Elevated cholesterol     History of renal cell cancer 2014    Left    Insomnia        Past Surgical History:   Procedure Laterality Date    CERVICAL DISCECTOMY      DIALYSIS FISTULA CREATION  04/27/2023    Left Arteriovenous Fistula  Creation Left 01/26/2023    PARTIAL NEPHRECTOMY Left 2014    TRANSURETHRAL RESECTION OF PROSTATE         Family History   Problem Relation Age of Onset    Heart disease Mother        Social History     Socioeconomic History    Marital status:    Tobacco Use    Smoking status: Never    Smokeless tobacco: Never   Substance and Sexual Activity    Alcohol use: Not Currently       ROS  Pertinent negatives include no change  in bowel habit, chest pain, chills or coughing.   Review of Systems   Constitutional: Negative for chills and night sweats.   Eyes:  Positive for pain.   Cardiovascular:  Negative for chest pain, claudication and dyspnea on exertion.   Respiratory:  Negative for cough and shortness of breath.    Gastrointestinal:  Negative for change in bowel habit, constipation and diarrhea.      Objective:   /73   Pulse 95   Resp 18   Wt 70.1 kg (154 lb 9.6 oz)   SpO2 96%   BMI 23.51 kg/m²     Physical Exam  Constitutional:       Appearance: He is normal weight.   HENT:      Head: Normocephalic.   Eyes:      Pupils: Pupils are equal, round, and reactive to light.   Cardiovascular:      Rate and Rhythm: Normal rate and regular rhythm.      Comments: Thrill palpated left fistula. Radial +2   Pulmonary:      Effort: Pulmonary effort is normal.   Abdominal:      General: Abdomen is flat. Bowel sounds are normal.   Musculoskeletal:      Cervical back: Normal range of motion and neck supple.   Skin:     General: Skin is warm.      Comments: Left surgical site well approximated with no erythema or drainage. MARIE drain in place. Removed patient tolerated well!   Neurological:      Mental Status: He is alert.     Assessment/Plan:   Sukhwinder Larios is a 77 y.o. male with history of end-stage renal disease on hemodialysis via right IJ tunneled dialysis catheter Monday, Wednesday who presents for P.O. f/u of Left upper extremity brachiobasilic arteriovenous fistula elevation and transposition on 4/27/23.     Drain with minimal output.  Serosanguineous output.  Drain removed in clinic.    -Will have patient follow-up in 1 month with fistula duplex to evaluate maturity of the fistula   -Discussed with patient wound care and activity restrictions; okay to take down the drain dressing in 2 days.  Okay start showering today.  Okay to start driving in 2 weeks.  -Answered patient and his wife's questions to their satisfaction.  Both  expressed understanding of the above plan.    Librado Grajeda MD  Vascular Surgery

## 2023-05-01 NOTE — PROGRESS NOTES
Subjective:      Patient ID: Sukhwinder Larios is a 77 y.o. male who presents for P.O. f/u of Left upper extremity brachiobasilic arteriovenous fistula elevation and transposition on 4/27/23. He has a heidi drain in place that has had mimimal bloody drainage over the weekend. He denies any s/s of infection at the surgical site. He denies pain in the left hand or problems with circulation. He denies fever or chills.Chief Complaint: Follow-up    Follow-up  Pertinent negatives include no change in bowel habit, chest pain, chills or coughing.   Review of Systems   Constitutional: Negative for chills and night sweats.   Eyes:  Positive for pain.   Cardiovascular:  Negative for chest pain, claudication and dyspnea on exertion.   Respiratory:  Negative for cough and shortness of breath.    Gastrointestinal:  Negative for change in bowel habit, constipation and diarrhea.    Past Medical History:   Diagnosis Date    BPH with urinary obstruction     CKD (chronic kidney disease)     Elevated cholesterol     History of renal cell cancer 2014    Left    Insomnia       Past Surgical History:   Procedure Laterality Date    CERVICAL DISCECTOMY      DIALYSIS FISTULA CREATION  04/27/2023    Left Arteriovenous Fistula  Creation Left 01/26/2023    PARTIAL NEPHRECTOMY Left 2014    TRANSURETHRAL RESECTION OF PROSTATE        Family History   Problem Relation Age of Onset    Heart disease Mother       Social History     Socioeconomic History    Marital status:    Tobacco Use    Smoking status: Never    Smokeless tobacco: Never   Substance and Sexual Activity    Alcohol use: Not Currently        Medication List with Changes/Refills   Current Medications    ROSUVASTATIN (CRESTOR) 10 MG TABLET    Take 10 mg by mouth once daily.    TAMSULOSIN (FLOMAX) 0.4 MG CAP        TOLTERODINE (DETROL LA) 4 MG 24 HR CAPSULE    TAKE 1 CAPSULE BY MOUTH EVERY DAY AS DIRECTED    ZOLPIDEM (AMBIEN) 5 MG TAB    Take 5 mg by mouth every evening.         Objective:     /73   Pulse 95   Resp 18   Wt 70.1 kg (154 lb 9.6 oz)   SpO2 96%   BMI 23.51 kg/m²     Physical Exam  Constitutional:       Appearance: He is normal weight.   HENT:      Head: Normocephalic.   Eyes:      Pupils: Pupils are equal, round, and reactive to light.   Cardiovascular:      Rate and Rhythm: Normal rate and regular rhythm.      Comments: Thrill palpated left fistula. Radial +2   Pulmonary:      Effort: Pulmonary effort is normal.   Abdominal:      General: Abdomen is flat. Bowel sounds are normal.   Musculoskeletal:      Cervical back: Normal range of motion and neck supple.   Skin:     General: Skin is warm.      Comments: Left surgical site well approximated with no erythema or drainage. MARIE drain in place. Removed patient tolerated well!   Neurological:      Mental Status: He is alert.        Labs:  CMP  No results found for: NA, K, CL, CO2, GLU, BUN, CREATININE, CALCIUM, PROT, ALBUMIN, BILITOT, ALKPHOS, AST, ALT, ANIONGAP, ESTGFRAFRICA, EGFRNONAA   No results found for: ALT, AST, GGT, ALKPHOS, BILITOT   No results found for: PT  No results found for: WBC, HGB, HCT, MCV, LABPLAT    No results found for: BLOODTYPE    Imaging:  No results found for this or any previous visit.     No results found for this or any previous visit.      X-Ray Chest PA And Lateral                                RADIOLOGY REPORT        PT NAME: JARETT FRIEDMAN      [facility]     : 1945 M 77             [address] [facility]    ACCT: [id number]                                              [facility] Rec #: [id number]                                        [id number]    Patient Location: AL.OR/             Procedure: CHEST 2 VIEWS    REQUISITION #: [id number]      REPORT #: [id number]           DATE OF EXAM: 23    TIME OF EXAM:        PA AND LATERAL CHEST RADIOGRAPHS        INDICATION:  PRE OP        COMPARISON: None        TECHNIQUE: PA and lateral  chest radiographs        FINDINGS:        Right internal jugular tunneled dialysis catheter terminates in good   position in the right atrium.        The cardiomediastinal silhouette is normal.        The lungs are clear.        There is no pleural effusion or pneumothorax.        IMPRESSION:        No radiographic evidence of acute cardiopulmonary disease        Electronically signed by: Chris Seth MD (4/25/2023 2:57 PM)        DICTATING PHYSICIAN:  ORVILLE SETH MD                   Date Dictated: 04/25/23 1420        Signed By:  ORVILLE SETH MD     Date Signed:  04/25/23 1459     CC: ABIGAIL RODRIGUEZ MD ; ABIGAIL RODRIGUEZ MD      ADMITTING PHYSICIAN:                                                                                                    ORDERING PHY: ABIGAIL RODRIGUEZ MD                                                                                                                                                      ATTENDING PHY: ABIGAIL RODRIGUEZ MD    Patient Status:  PRE SDC    Admit Service Date: 04/25/23          No image results found.       No results found for this visit on 05/01/23.               Assessment & Plan:     ***

## 2023-06-07 NOTE — PROGRESS NOTES
[facility] [facility] - Vascular Surgery  Follow-up Visit       Subjective:     Chief Complaint/Reason for Clinic Visit: 1 month follow up s/p left upper extremity brachial basilic AV fistula elevation and transposition on 04/27/2023    Mr. Larios is a 77 y.o. gentleman with history of end-stage renal disease on hemodialysis via right IJ tunneled dialysis catheter Monday, Wednesday who presents to our office today for 1 month f/u of left upper extremity brachiobasilic arteriovenous fistula elevation and transposition on 4/27/23.   At the last office visit, MARIE drain was removed at the office uneventfully.   He's to get fistula duplex in office to evaluate maturity of the AVF and readiness to use.        Current Outpatient Medications on File Prior to Visit   Medication Sig Dispense Refill    rosuvastatin (CRESTOR) 10 MG tablet Take 10 mg by mouth once daily.      tamsulosin (FLOMAX) 0.4 mg Cap       tolterodine (DETROL LA) 4 MG 24 hr capsule TAKE 1 CAPSULE BY MOUTH EVERY DAY AS DIRECTED      zolpidem (AMBIEN) 5 MG Tab Take 5 mg by mouth every evening.       No current facility-administered medications on file prior to visit.       Review of patient's allergies indicates:  No Known Allergies  Past Medical History:   Diagnosis Date    BPH with urinary obstruction     CKD (chronic kidney disease)     Elevated cholesterol     History of renal cell cancer 2014    Left    Insomnia        Past Surgical History:   Procedure Laterality Date    CERVICAL DISCECTOMY      DIALYSIS FISTULA CREATION  04/27/2023    Left Arteriovenous Fistula  Creation Left 01/26/2023    PARTIAL NEPHRECTOMY Left 2014    TRANSURETHRAL RESECTION OF PROSTATE         Family History   Problem Relation Age of Onset    Heart disease Mother        Social History     Socioeconomic History    Marital status:    Tobacco Use    Smoking status: Never    Smokeless tobacco: Never   Substance and Sexual Activity    Alcohol use: Not Currently           Review of  Systems   Constitutional: Negative.    HENT: Negative.     Eyes: Negative.    Respiratory: Negative.     Cardiovascular: Negative.    Gastrointestinal: Negative.    Genitourinary: Negative.    Musculoskeletal: Negative.    Skin: Negative.    Neurological: Negative.    Psychiatric/Behavioral: Negative.       Objective:   There were no vitals taken for this visit.    Physical Exam  Constitutional:       Appearance: Normal appearance. He is normal weight.   HENT:      Head: Normocephalic and atraumatic.   Eyes:      Extraocular Movements: Extraocular movements intact.      Pupils: Pupils are equal, round, and reactive to light.   Cardiovascular:      Rate and Rhythm: Normal rate and regular rhythm.   Abdominal:      General: Abdomen is flat. Bowel sounds are normal.      Palpations: Abdomen is soft.   Musculoskeletal:         General: Normal range of motion.      Cervical back: Normal range of motion and neck supple.   Skin:     General: Skin is warm and dry.      Comments: LUE: AVF + thrills, + radial pulses, brisk capillary refills.    Neurological:      General: No focal deficit present.      Mental Status: He is alert and oriented to person, place, and time. Mental status is at baseline.   Psychiatric:         Mood and Affect: Mood normal.         Behavior: Behavior normal.         Thought Content: Thought content normal.         Judgment: Judgment normal.       Assessment/Plan:   Mr. Larios is a 77 y.o. gentleman with history of end-stage renal disease on hemodialysis via right IJ tunneled dialysis catheter Monday, Wednesday who presents to our office today for 1 month f/u of left upper extremity brachiobasilic arteriovenous fistula elevation and transposition on 4/27/23. He's to get fistula duplex in office today to evaluate maturity of the AVF and its readiness to use.     Fistula duplex done in office and personally reviewed.  The flows with official order to be greater than 1500 cc/minute.  The fistula was  noted to be less than 6 mm away from the skin.  The fistula size was noted to be approximately 5-6 mm.  Discussed with patient his wife that it will be okay to start using the arteriovenous fistula on Monday 6/12/23. This was communicated with Ms. Stefani Samaniego at [facility] by Kat Bourne NP.     - Patient can follow-up in clinic as needed   - Answered patient and his wife's questions to their satisfaction.  Patient and his wife expressed understanding of the above plan.    Librado Grajeda MD  Vascular Surgery

## 2023-06-08 ENCOUNTER — OFFICE VISIT (OUTPATIENT)
Dept: CARDIOTHORACIC SURGERY | Facility: CLINIC | Age: 78
End: 2023-06-08
Payer: MEDICARE

## 2023-06-08 VITALS
RESPIRATION RATE: 18 BRPM | DIASTOLIC BLOOD PRESSURE: 73 MMHG | HEART RATE: 79 BPM | SYSTOLIC BLOOD PRESSURE: 121 MMHG | OXYGEN SATURATION: 96 %

## 2023-06-08 DIAGNOSIS — Z98.890 S/P ARTERIOVENOUS (AV) FISTULA CREATION: Primary | ICD-10-CM

## 2023-06-08 PROCEDURE — 99024 PR POST-OP FOLLOW-UP VISIT: ICD-10-PCS | Mod: S$GLB,,, | Performed by: SURGERY

## 2023-06-08 PROCEDURE — 3074F SYST BP LT 130 MM HG: CPT | Mod: CPTII,S$GLB,, | Performed by: SURGERY

## 2023-06-08 PROCEDURE — 1126F AMNT PAIN NOTED NONE PRSNT: CPT | Mod: CPTII,S$GLB,, | Performed by: SURGERY

## 2023-06-08 PROCEDURE — 1126F PR PAIN SEVERITY QUANTIFIED, NO PAIN PRESENT: ICD-10-PCS | Mod: CPTII,S$GLB,, | Performed by: SURGERY

## 2023-06-08 PROCEDURE — 3074F PR MOST RECENT SYSTOLIC BLOOD PRESSURE < 130 MM HG: ICD-10-PCS | Mod: CPTII,S$GLB,, | Performed by: SURGERY

## 2023-06-08 PROCEDURE — 3078F DIAST BP <80 MM HG: CPT | Mod: CPTII,S$GLB,, | Performed by: SURGERY

## 2023-06-08 PROCEDURE — 99024 POSTOP FOLLOW-UP VISIT: CPT | Mod: S$GLB,,, | Performed by: SURGERY

## 2023-06-08 PROCEDURE — 3078F PR MOST RECENT DIASTOLIC BLOOD PRESSURE < 80 MM HG: ICD-10-PCS | Mod: CPTII,S$GLB,, | Performed by: SURGERY

## 2023-06-08 RX ORDER — MECLIZINE HCL 12.5 MG 12.5 MG/1
12.5 TABLET ORAL 3 TIMES DAILY PRN
COMMUNITY

## 2023-09-05 ENCOUNTER — OUTSIDE PLACE OF SERVICE (OUTPATIENT)
Dept: INTERVENTIONAL RADIOLOGY/VASCULAR | Facility: CLINIC | Age: 78
End: 2023-09-05
Payer: MEDICARE

## 2023-09-05 PROCEDURE — 36902 PR INTRO CATH, DIALYSIS CIRCUIT W/TRANSLML BALLOON ANGIO: ICD-10-PCS | Mod: ,,, | Performed by: RADIOLOGY

## 2023-09-05 PROCEDURE — 36902 INTRO CATH DIALYSIS CIRCUIT: CPT | Mod: ,,, | Performed by: RADIOLOGY

## 2024-01-11 ENCOUNTER — OUTSIDE PLACE OF SERVICE (OUTPATIENT)
Dept: INTERVENTIONAL RADIOLOGY/VASCULAR | Facility: CLINIC | Age: 79
End: 2024-01-11
Payer: MEDICARE

## 2024-01-11 PROCEDURE — 36901 INTRO CATH DIALYSIS CIRCUIT: CPT | Mod: LT,,, | Performed by: STUDENT IN AN ORGANIZED HEALTH CARE EDUCATION/TRAINING PROGRAM

## 2024-02-22 ENCOUNTER — OUTSIDE PLACE OF SERVICE (OUTPATIENT)
Dept: INTERVENTIONAL RADIOLOGY/VASCULAR | Facility: CLINIC | Age: 79
End: 2024-02-22
Payer: MEDICARE

## 2024-02-22 PROCEDURE — 36589 REMOVAL TUNNELED CV CATH: CPT | Mod: ,,, | Performed by: STUDENT IN AN ORGANIZED HEALTH CARE EDUCATION/TRAINING PROGRAM
